# Patient Record
Sex: FEMALE | Race: WHITE | NOT HISPANIC OR LATINO
[De-identification: names, ages, dates, MRNs, and addresses within clinical notes are randomized per-mention and may not be internally consistent; named-entity substitution may affect disease eponyms.]

---

## 2017-05-26 ENCOUNTER — HEALTH MAINTENANCE LETTER (OUTPATIENT)
Age: 27
End: 2017-05-26

## 2017-06-14 ENCOUNTER — RECORDS - HEALTHEAST (OUTPATIENT)
Dept: ADMINISTRATIVE | Facility: OTHER | Age: 27
End: 2017-06-14

## 2018-02-23 ENCOUNTER — OFFICE VISIT - HEALTHEAST (OUTPATIENT)
Dept: FAMILY MEDICINE | Facility: CLINIC | Age: 28
End: 2018-02-23

## 2018-02-23 ENCOUNTER — COMMUNICATION - HEALTHEAST (OUTPATIENT)
Dept: TELEHEALTH | Facility: CLINIC | Age: 28
End: 2018-02-23

## 2018-02-23 DIAGNOSIS — Z01.818 ENCOUNTER FOR PREOPERATIVE EXAMINATION FOR GENERAL SURGICAL PROCEDURE: ICD-10-CM

## 2018-02-23 DIAGNOSIS — J32.9 CHRONIC SINUSITIS: ICD-10-CM

## 2018-02-23 DIAGNOSIS — J34.2 DEVIATED SEPTUM: ICD-10-CM

## 2018-02-23 LAB — HGB BLD-MCNC: 13.9 G/DL (ref 12–16)

## 2018-02-23 ASSESSMENT — MIFFLIN-ST. JEOR: SCORE: 1846.91

## 2018-05-25 ENCOUNTER — OFFICE VISIT - HEALTHEAST (OUTPATIENT)
Dept: FAMILY MEDICINE | Facility: CLINIC | Age: 28
End: 2018-05-25

## 2018-05-25 DIAGNOSIS — M79.7 FIBROMYALGIA: ICD-10-CM

## 2018-05-25 DIAGNOSIS — F41.1 GENERALIZED ANXIETY DISORDER: ICD-10-CM

## 2018-05-25 DIAGNOSIS — K21.9 ESOPHAGEAL REFLUX: ICD-10-CM

## 2018-05-25 DIAGNOSIS — J45.30 MILD PERSISTENT ASTHMA WITHOUT COMPLICATION: ICD-10-CM

## 2018-05-25 DIAGNOSIS — F32.A DEPRESSION: ICD-10-CM

## 2018-08-22 ENCOUNTER — AMBULATORY - HEALTHEAST (OUTPATIENT)
Dept: MULTI SPECIALTY CLINIC | Facility: CLINIC | Age: 28
End: 2018-08-22

## 2018-08-22 LAB
HPV_EXT - HISTORICAL: NORMAL
PAP SMEAR - HIM PATIENT REPORTED: NORMAL

## 2019-04-04 ENCOUNTER — OFFICE VISIT - HEALTHEAST (OUTPATIENT)
Dept: FAMILY MEDICINE | Facility: CLINIC | Age: 29
End: 2019-04-04

## 2019-04-04 DIAGNOSIS — J45.30 MILD PERSISTENT ASTHMA WITHOUT COMPLICATION: ICD-10-CM

## 2019-04-04 DIAGNOSIS — K21.9 ESOPHAGEAL REFLUX: ICD-10-CM

## 2019-04-04 DIAGNOSIS — M76.60 ACHILLES TENDON PAIN: ICD-10-CM

## 2019-04-04 DIAGNOSIS — Z01.818 ENCOUNTER FOR PREOPERATIVE EXAMINATION FOR GENERAL SURGICAL PROCEDURE: ICD-10-CM

## 2019-04-04 DIAGNOSIS — F32.A DEPRESSION: ICD-10-CM

## 2019-04-04 DIAGNOSIS — M79.7 FIBROMYALGIA: ICD-10-CM

## 2019-04-04 DIAGNOSIS — F41.1 GENERALIZED ANXIETY DISORDER: ICD-10-CM

## 2019-04-04 LAB
HCG UR QL: NEGATIVE
HGB BLD-MCNC: 13.6 G/DL (ref 12–16)

## 2019-05-20 ENCOUNTER — COMMUNICATION - HEALTHEAST (OUTPATIENT)
Dept: FAMILY MEDICINE | Facility: CLINIC | Age: 29
End: 2019-05-20

## 2019-05-20 DIAGNOSIS — F32.A DEPRESSION: ICD-10-CM

## 2019-10-18 ENCOUNTER — AMBULATORY - HEALTHEAST (OUTPATIENT)
Dept: LAB | Facility: CLINIC | Age: 29
End: 2019-10-18

## 2019-10-18 DIAGNOSIS — E66.01 MORBID OBESITY DUE TO EXCESS CALORIES (H): ICD-10-CM

## 2019-10-18 LAB
CORTIS SERPL-MCNC: 12.4 UG/DL
FASTING STATUS PATIENT QL REPORTED: YES
GLUCOSE BLD-MCNC: 79 MG/DL (ref 70–125)

## 2019-10-20 LAB — INSULIN SERPL-ACNC: 15.5 MU/L (ref 3–25)

## 2019-11-14 ENCOUNTER — OFFICE VISIT - HEALTHEAST (OUTPATIENT)
Dept: FAMILY MEDICINE | Facility: CLINIC | Age: 29
End: 2019-11-14

## 2019-11-14 DIAGNOSIS — M76.60 ACHILLES TENDON PAIN: ICD-10-CM

## 2019-11-14 DIAGNOSIS — Z01.818 ENCOUNTER FOR PREOPERATIVE EXAMINATION FOR GENERAL SURGICAL PROCEDURE: ICD-10-CM

## 2019-11-14 DIAGNOSIS — K58.0 IRRITABLE BOWEL SYNDROME WITH DIARRHEA: ICD-10-CM

## 2019-11-14 DIAGNOSIS — E66.01 MORBID OBESITY (H): ICD-10-CM

## 2019-11-14 LAB — HCG UR QL: NEGATIVE

## 2019-11-14 ASSESSMENT — ANXIETY QUESTIONNAIRES
IF YOU CHECKED OFF ANY PROBLEMS ON THIS QUESTIONNAIRE, HOW DIFFICULT HAVE THESE PROBLEMS MADE IT FOR YOU TO DO YOUR WORK, TAKE CARE OF THINGS AT HOME, OR GET ALONG WITH OTHER PEOPLE: SOMEWHAT DIFFICULT
3. WORRYING TOO MUCH ABOUT DIFFERENT THINGS: NEARLY EVERY DAY
6. BECOMING EASILY ANNOYED OR IRRITABLE: SEVERAL DAYS
4. TROUBLE RELAXING: NEARLY EVERY DAY
1. FEELING NERVOUS, ANXIOUS, OR ON EDGE: NEARLY EVERY DAY
2. NOT BEING ABLE TO STOP OR CONTROL WORRYING: MORE THAN HALF THE DAYS
5. BEING SO RESTLESS THAT IT IS HARD TO SIT STILL: MORE THAN HALF THE DAYS
7. FEELING AFRAID AS IF SOMETHING AWFUL MIGHT HAPPEN: NEARLY EVERY DAY
GAD7 TOTAL SCORE: 17

## 2019-11-14 ASSESSMENT — PATIENT HEALTH QUESTIONNAIRE - PHQ9: SUM OF ALL RESPONSES TO PHQ QUESTIONS 1-9: 12

## 2020-03-25 ENCOUNTER — COMMUNICATION - HEALTHEAST (OUTPATIENT)
Dept: FAMILY MEDICINE | Facility: CLINIC | Age: 30
End: 2020-03-25

## 2020-03-25 DIAGNOSIS — K21.9 ESOPHAGEAL REFLUX: ICD-10-CM

## 2020-05-11 ENCOUNTER — COMMUNICATION - HEALTHEAST (OUTPATIENT)
Dept: FAMILY MEDICINE | Facility: CLINIC | Age: 30
End: 2020-05-11

## 2020-05-11 DIAGNOSIS — F41.1 GENERALIZED ANXIETY DISORDER: ICD-10-CM

## 2020-05-11 DIAGNOSIS — M79.7 FIBROMYALGIA: ICD-10-CM

## 2020-05-11 DIAGNOSIS — F32.A DEPRESSION: ICD-10-CM

## 2020-05-16 ENCOUNTER — COMMUNICATION - HEALTHEAST (OUTPATIENT)
Dept: FAMILY MEDICINE | Facility: CLINIC | Age: 30
End: 2020-05-16

## 2020-05-16 DIAGNOSIS — J45.30 MILD PERSISTENT ASTHMA WITHOUT COMPLICATION: ICD-10-CM

## 2020-07-15 ENCOUNTER — RECORDS - HEALTHEAST (OUTPATIENT)
Dept: ADMINISTRATIVE | Facility: OTHER | Age: 30
End: 2020-07-15

## 2020-07-16 ENCOUNTER — RECORDS - HEALTHEAST (OUTPATIENT)
Dept: ADMINISTRATIVE | Facility: OTHER | Age: 30
End: 2020-07-16

## 2020-07-16 LAB
CREAT SERPL-MCNC: 0.82 MG/DL (ref 0.6–1.02)
GFR ESTIMATE EXT - HISTORICAL: >60 ML/MIN/1.73M2
GFR ESTIMATE, IF BLACK EXT - HISTORICAL: >60 ML/MIN/1.73M2

## 2020-07-17 ENCOUNTER — RECORDS - HEALTHEAST (OUTPATIENT)
Dept: ADMINISTRATIVE | Facility: OTHER | Age: 30
End: 2020-07-17

## 2020-07-21 ENCOUNTER — RECORDS - HEALTHEAST (OUTPATIENT)
Dept: ADMINISTRATIVE | Facility: OTHER | Age: 30
End: 2020-07-21

## 2020-07-23 ENCOUNTER — RECORDS - HEALTHEAST (OUTPATIENT)
Dept: HEALTH INFORMATION MANAGEMENT | Facility: CLINIC | Age: 30
End: 2020-07-23

## 2020-08-17 ENCOUNTER — COMMUNICATION - HEALTHEAST (OUTPATIENT)
Dept: FAMILY MEDICINE | Facility: CLINIC | Age: 30
End: 2020-08-17

## 2020-09-11 ENCOUNTER — RECORDS - HEALTHEAST (OUTPATIENT)
Dept: ADMINISTRATIVE | Facility: OTHER | Age: 30
End: 2020-09-11

## 2020-09-16 ENCOUNTER — COMMUNICATION - HEALTHEAST (OUTPATIENT)
Dept: SCHEDULING | Facility: CLINIC | Age: 30
End: 2020-09-16

## 2020-09-18 ENCOUNTER — COMMUNICATION - HEALTHEAST (OUTPATIENT)
Dept: FAMILY MEDICINE | Facility: CLINIC | Age: 30
End: 2020-09-18

## 2020-09-18 ENCOUNTER — OFFICE VISIT - HEALTHEAST (OUTPATIENT)
Dept: FAMILY MEDICINE | Facility: CLINIC | Age: 30
End: 2020-09-18

## 2020-09-18 DIAGNOSIS — Z71.89 COUNSELING AND COORDINATION OF CARE: ICD-10-CM

## 2020-09-21 ENCOUNTER — COMMUNICATION - HEALTHEAST (OUTPATIENT)
Dept: FAMILY MEDICINE | Facility: CLINIC | Age: 30
End: 2020-09-21

## 2020-09-22 ENCOUNTER — RECORDS - HEALTHEAST (OUTPATIENT)
Dept: ADMINISTRATIVE | Facility: OTHER | Age: 30
End: 2020-09-22

## 2020-10-08 ENCOUNTER — COMMUNICATION - HEALTHEAST (OUTPATIENT)
Dept: FAMILY MEDICINE | Facility: CLINIC | Age: 30
End: 2020-10-08

## 2020-10-22 ENCOUNTER — COMMUNICATION - HEALTHEAST (OUTPATIENT)
Dept: FAMILY MEDICINE | Facility: CLINIC | Age: 30
End: 2020-10-22

## 2020-11-17 ENCOUNTER — RECORDS - HEALTHEAST (OUTPATIENT)
Dept: ADMINISTRATIVE | Facility: OTHER | Age: 30
End: 2020-11-17

## 2021-01-08 ENCOUNTER — COMMUNICATION - HEALTHEAST (OUTPATIENT)
Dept: FAMILY MEDICINE | Facility: CLINIC | Age: 31
End: 2021-01-08

## 2021-01-08 DIAGNOSIS — F32.A DEPRESSION: ICD-10-CM

## 2021-02-14 ENCOUNTER — COMMUNICATION - HEALTHEAST (OUTPATIENT)
Dept: FAMILY MEDICINE | Facility: CLINIC | Age: 31
End: 2021-02-14

## 2021-02-14 DIAGNOSIS — J45.30 MILD PERSISTENT ASTHMA WITHOUT COMPLICATION: ICD-10-CM

## 2021-02-18 ENCOUNTER — RECORDS - HEALTHEAST (OUTPATIENT)
Dept: ADMINISTRATIVE | Facility: OTHER | Age: 31
End: 2021-02-18

## 2021-04-07 ENCOUNTER — COMMUNICATION - HEALTHEAST (OUTPATIENT)
Dept: FAMILY MEDICINE | Facility: CLINIC | Age: 31
End: 2021-04-07

## 2021-04-07 DIAGNOSIS — K21.9 ESOPHAGEAL REFLUX: ICD-10-CM

## 2021-04-15 ENCOUNTER — RECORDS - HEALTHEAST (OUTPATIENT)
Dept: ADMINISTRATIVE | Facility: OTHER | Age: 31
End: 2021-04-15

## 2021-04-26 ENCOUNTER — RECORDS - HEALTHEAST (OUTPATIENT)
Dept: ADMINISTRATIVE | Facility: OTHER | Age: 31
End: 2021-04-26

## 2021-05-10 ENCOUNTER — RECORDS - HEALTHEAST (OUTPATIENT)
Dept: ADMINISTRATIVE | Facility: OTHER | Age: 31
End: 2021-05-10

## 2021-05-25 ENCOUNTER — OFFICE VISIT - HEALTHEAST (OUTPATIENT)
Dept: FAMILY MEDICINE | Facility: CLINIC | Age: 31
End: 2021-05-25

## 2021-05-25 DIAGNOSIS — F33.0 MILD EPISODE OF RECURRENT MAJOR DEPRESSIVE DISORDER (H): ICD-10-CM

## 2021-05-25 DIAGNOSIS — F41.8 POSTPARTUM ANXIETY: ICD-10-CM

## 2021-05-25 ASSESSMENT — ANXIETY QUESTIONNAIRES
6. BECOMING EASILY ANNOYED OR IRRITABLE: SEVERAL DAYS
1. FEELING NERVOUS, ANXIOUS, OR ON EDGE: NEARLY EVERY DAY
2. NOT BEING ABLE TO STOP OR CONTROL WORRYING: NEARLY EVERY DAY
4. TROUBLE RELAXING: NEARLY EVERY DAY
7. FEELING AFRAID AS IF SOMETHING AWFUL MIGHT HAPPEN: NEARLY EVERY DAY
GAD7 TOTAL SCORE: 19
3. WORRYING TOO MUCH ABOUT DIFFERENT THINGS: NEARLY EVERY DAY
5. BEING SO RESTLESS THAT IT IS HARD TO SIT STILL: NEARLY EVERY DAY
IF YOU CHECKED OFF ANY PROBLEMS ON THIS QUESTIONNAIRE, HOW DIFFICULT HAVE THESE PROBLEMS MADE IT FOR YOU TO DO YOUR WORK, TAKE CARE OF THINGS AT HOME, OR GET ALONG WITH OTHER PEOPLE: SOMEWHAT DIFFICULT

## 2021-05-25 ASSESSMENT — PATIENT HEALTH QUESTIONNAIRE - PHQ9: SUM OF ALL RESPONSES TO PHQ QUESTIONS 1-9: 8

## 2021-05-26 ASSESSMENT — PATIENT HEALTH QUESTIONNAIRE - PHQ9: SUM OF ALL RESPONSES TO PHQ QUESTIONS 1-9: 12

## 2021-05-27 NOTE — PROGRESS NOTES
Preoperative Exam    Scheduled Procedure: Left achilles tendon repair  Surgery Date:  4/24/19  Surgery Location: [facility] FAX: [phone]  Surgeon:  Dr. Liao    Assessment/Plan:     1. Encounter for preoperative examination for general surgical procedure  Hemoglobin normal. Urine pregnancy negative.   - Pregnancy, Urine  - Hemoglobin    2. Achilles tendon pain    3. Mild persistent asthma without complication  ACT score of 24.  Well controlled.  Continue Singulair once daily and albuterol as needed.   - albuterol (PROAIR HFA;PROVENTIL HFA;VENTOLIN HFA) 90 mcg/actuation inhaler; Inhale 2-4 puffs every 6 (six) hours as needed for wheezing or shortness of breath.  Dispense: 1 each; Refill: 5  - montelukast (SINGULAIR) 10 mg tablet; Take 1 tablet (10 mg total) by mouth at bedtime.  Dispense: 90 tablet; Refill: 3    4. Generalized anxiety disorder  Well controlled.  - busPIRone (BUSPAR) 15 MG tablet; Take 1 tablet (15 mg total) by mouth 2 (two) times a day.  Dispense: 180 tablet; Refill: 3    5. Fibromyalgia  - nortriptyline (PAMELOR) 25 MG capsule; Take 1 capsule (25 mg total) by mouth at bedtime.  Dispense: 90 capsule; Refill: 3    6. Esophageal reflux  Well controlled on medication.   - pantoprazole (PROTONIX) 40 MG tablet; Take 1 tablet (40 mg total) by mouth daily.  Dispense: 90 tablet; Refill: 3    7. Depression  Well controlled on Zoloft.  She will continue seeing her therapist regularly.   - sertraline (ZOLOFT) 100 MG tablet; Take 1 tablet (100 mg total) by mouth daily.  Dispense: 90 tablet; Refill: 3     Surgical Procedure Risk: Low (reported cardiac risk generally < 1%)  Have you had prior anesthesia?: Yes  Have you or any family members had a previous anesthesia reaction:  No  Do you or any family members have a history of a clotting or bleeding disorder?: No  Cardiac Risk Assessment: no increased risk for major cardiac complications    Patient approved for surgery with general or local  anesthesia.    Please Note:  None    Functional Status: Independent  Patient plans to recover at home with family.     Subjective:      Paige Baumann is a 28 y.o. female who presents for a preoperative consultation.  She is scheduled for a left achilles tendon repair.  She has had persistent pain and swelling to this area for the past 4 years or so.  No history of rupture.  She has tried several conservative treatments including steroid injections, PT, immobilization, and a PRP injection.    Patient is also requesting medication refills.    Asthma: Currently on Singulair once daily and albuterol as needed.  Asthma is triggered by seasonal allergies and exercise.  Currently denies any cough, shortness of breath, wheezing, or chest pain.    History of anxiety and depression.  Currently on sertraline and BuSpar.  She has been struggling a little bit with anxiety, as her therapist has been on maternity leave.  She normally sees her 2 times a month.  Overall, patient reports her symptoms are well controlled, and the accommodation of medications is working well for her.  Denies any suicidal ideations.  She has recently started using some CBD oil, and this has been very beneficial for her anxiety.    Patient utilizes pantoprazole once daily for acid reflux.  This is working well for her.    On nortriptyline for fibromyalgia pain and headaches.  Patient did try discontinuing the medication on her own this past year, but did endorse increased body pain.  She is since back on the medication    All other systems reviewed and are negative, other than those listed in the HPI.    Pertinent History  Do you have difficulty breathing or chest pain after walking up a flight of stairs: No  History of obstructive sleep apnea: No  Steroid use in the last 6 months: No  Frequent Aspirin/NSAID use: No  Prior Blood Transfusion: No  Prior Blood Transfusion Reaction: N/A  If for some reason prior to, during or after the procedure, if it  is medically indicated, would you be willing to have a blood transfusion?:  There is no transfusion refusal.    Current Outpatient Medications   Medication Sig Dispense Refill     cetirizine (ZYRTEC) 10 MG tablet Take 10 mg by mouth.       clobetasol (TEMOVATE) 0.05 % external solution        ketoconazole (NIZORAL) 2 % shampoo WASH SCALP 2-3 TIMES PER WEEK. LET LATHER AND SOAK FOR A FEW MINUTES BEFORE RINSING  3     LOW-OGESTREL, 28, 0.3-30 mg-mcg per tablet Take 1 tablet by mouth daily.  3     naproxen (NAPROSYN) 500 MG tablet TAKE 1 TABLET BY MOUTH TWICE DAILY       valACYclovir (VALTREX) 1000 MG tablet TK 2 TS PO NOW AND REPEAT IN 12 H UTD       albuterol (PROAIR HFA;PROVENTIL HFA;VENTOLIN HFA) 90 mcg/actuation inhaler Inhale 2-4 puffs every 6 (six) hours as needed for wheezing or shortness of breath. 1 each 5     busPIRone (BUSPAR) 15 MG tablet Take 1 tablet (15 mg total) by mouth 2 (two) times a day. 180 tablet 3     montelukast (SINGULAIR) 10 mg tablet Take 1 tablet (10 mg total) by mouth at bedtime. 90 tablet 3     nortriptyline (PAMELOR) 25 MG capsule Take 1 capsule (25 mg total) by mouth at bedtime. 90 capsule 3     pantoprazole (PROTONIX) 40 MG tablet Take 1 tablet (40 mg total) by mouth daily. 90 tablet 3     sertraline (ZOLOFT) 100 MG tablet Take 1 tablet (100 mg total) by mouth daily. 90 tablet 3     No current facility-administered medications for this visit.         Allergies   Allergen Reactions     Cats Claw (Uncaria  [Cat's Claw (Uncaria Tomentosa)] Itching     Grass Itching     Levaquin [Levofloxacin]      Other Environmental Allergy Itching     Sulfa (Sulfonamide Antibiotics)      Quinolones Rash       Patient Active Problem List   Diagnosis     Vaginal septum     Single major depressive episode, in full remission (H)     Hidradenitis     Cervical high risk HPV (human papillomavirus) test positive     Anxiety state     Allergic rhinitis     Mild persistent asthma without complication      Esophageal reflux     Fibromyalgia     Pap smear abnormality of cervix with LGSIL       Past Medical History:   Diagnosis Date     Anemia      Anxiety      Asthma      Depression      Fibromyalgia        Past Surgical History:   Procedure Laterality Date     BUNIONECTOMY Left      SINUS SURGERY       TONSILLECTOMY       WISDOM TOOTH EXTRACTION         Social History     Socioeconomic History     Marital status: Single     Spouse name: Not on file     Number of children: Not on file     Years of education: Not on file     Highest education level: Not on file   Occupational History     Not on file   Social Needs     Financial resource strain: Not on file     Food insecurity:     Worry: Not on file     Inability: Not on file     Transportation needs:     Medical: Not on file     Non-medical: Not on file   Tobacco Use     Smoking status: Never Smoker     Smokeless tobacco: Never Used   Substance and Sexual Activity     Alcohol use: Yes     Alcohol/week: 3.0 oz     Types: 5 Shots of liquor per week     Drug use: No     Sexual activity: Yes     Partners: Male     Birth control/protection: OCP   Lifestyle     Physical activity:     Days per week: Not on file     Minutes per session: Not on file     Stress: Not on file   Relationships     Social connections:     Talks on phone: Not on file     Gets together: Not on file     Attends Jain service: Not on file     Active member of club or organization: Not on file     Attends meetings of clubs or organizations: Not on file     Relationship status: Not on file     Intimate partner violence:     Fear of current or ex partner: Not on file     Emotionally abused: Not on file     Physically abused: Not on file     Forced sexual activity: Not on file   Other Topics Concern     Not on file   Social History Narrative     Not on file       Patient Care Team:  Gisele Mandel NP as PCP - General (Family Medicine)          Objective:     Vitals:    04/04/19 1502   BP: 116/82    Pulse: 76   Weight: (!) 248 lb (112.5 kg)   LMP: 03/19/2019         Physical Exam:  General Appearance: Alert, cooperative, no distress, appears stated age  Eyes: PERRL, conjunctiva/corneas clear, EOM's intact  Ears: Normal TM's and external ear canals, both ears  Nose: Nares normal, septum midline,mucosa normal, no drainage  Throat: Lips, mucosa, and tongue normal; teeth and gums normal  Neck: Supple, symmetrical, trachea midline, no adenopathy;  thyroid: not enlarged, symmetric, no tenderness/mass/nodules; no carotid bruit or JVD  Back: no CVA tenderness  Lungs: Clear to auscultation bilaterally, respirations unlabored  Heart: Regular rate and rhythm, S1 and S2 normal, no murmur, rub, or gallop  Abdomen: Soft, non-tender, bowel sounds active all four quadrants,  no masses, no organomegaly  Extremities: Extremities normal, atraumatic, no cyanosis or edema  Skin: Skin color, texture, turgor normal, no rashes or lesions  Lymph nodes: Cervical, supraclavicular nodes normal  Neurologic: Normal   Psychologic: appropriate affective, answers all of my questions appropriately. No hallucinations, delusion, or suicidal ideations.      Patient Instructions     Hold all supplements, aspirin and NSAIDs for 7 days prior to surgery.  Follow your surgeon's direction on when to stop eating and drinking prior to surgery.  Your surgeon will be managing your pain after your surgery.    Remove all jewelry and metal piercings before your surgery.   Remove nail polish from fingers before surgery.      Labs:  Recent Results (from the past 24 hour(s))   Pregnancy, Urine    Collection Time: 04/04/19  3:37 PM   Result Value Ref Range    Pregnancy Test, Urine Negative Negative   Hemoglobin    Collection Time: 04/04/19  3:37 PM   Result Value Ref Range    Hemoglobin 13.6 12.0 - 16.0 g/dL       Immunization History   Administered Date(s) Administered     Dtap 1988, 02/22/1989, 04/21/1989, 05/17/1990, 03/31/1994     HPV Quadrivalent  03/08/2007, 05/08/2007     Hep A, Adult IM (19yr & older) 07/24/2007, 10/23/2012     Hep A, historic 10/23/2012     Hep B, Peds or Adolescent 06/15/1994, 08/28/1994, 01/28/1995     Hep B, historic 06/15/1994, 08/28/1994, 01/28/1995     Hepatitis A, Ped/Adol 2 Dose IM (18yr & under) 07/24/2007     HiB, historic,unspecified 05/17/1990     Hib (PRP-T) 05/17/1990     Influenza, Seasonal, Inj PF IIV3 10/30/2015     Influenza, inj, historic,unspecified 10/29/2002, 10/25/2004, 11/02/2005, 11/16/2006, 10/20/2008, 09/20/2012     Influenza,seasonal quad, PF, 36+MOS 12/03/2013     Influenza,seasonal quad, PF, 6-35MOS 12/03/2013     Influenza,seasonal, Inj IIV3 12/20/2000, 10/29/2002, 10/25/2004, 11/02/2005, 10/06/2010, 09/20/2012     MMR 01/12/1990, 03/05/2001     Meningococcal MCV4O 10/23/2012     Meningococcal MCV4P 07/19/2007     Meningococcal,Historic,Unknown serogroups 10/23/2012     Novel Influenza I3G3-64, Nasal 12/14/2009     OPV,Trivalent,Historic(9304-4715 only) 1988, 02/22/1989, 05/17/1990, 03/31/1994     POLIO, Unspecified 1988, 02/22/1989, 05/17/1990, 03/31/1994     PPD Test 07/24/2007     Td, Adult, Absorbed 09/25/2002     Tdap 10/23/2012     Typhoid, Inj, Inactive 10/23/2012     Typhoid, historic, Unspecified 10/23/2012     Yellow Fever 10/23/2012         Electronically signed by Gisele Mandel, VI 04/04/19 3:05 PM

## 2021-05-27 NOTE — PATIENT INSTRUCTIONS - HE
Hold all supplements, aspirin and NSAIDs for 7 days prior to surgery.  Follow your surgeon's direction on when to stop eating and drinking prior to surgery.  Your surgeon will be managing your pain after your surgery.    Remove all jewelry and metal piercings before your surgery.   Remove nail polish from fingers before surgery.

## 2021-05-28 ASSESSMENT — ASTHMA QUESTIONNAIRES: ACT_TOTALSCORE: 21

## 2021-05-28 ASSESSMENT — ANXIETY QUESTIONNAIRES: GAD7 TOTAL SCORE: 17

## 2021-06-01 VITALS — BODY MASS INDEX: 41.18 KG/M2 | WEIGHT: 251.3 LBS

## 2021-06-01 VITALS — BODY MASS INDEX: 39.97 KG/M2 | WEIGHT: 248.7 LBS | HEIGHT: 66 IN

## 2021-06-02 VITALS — WEIGHT: 248 LBS | BODY MASS INDEX: 41.27 KG/M2

## 2021-06-03 VITALS
TEMPERATURE: 98.6 F | DIASTOLIC BLOOD PRESSURE: 90 MMHG | BODY MASS INDEX: 43.93 KG/M2 | WEIGHT: 264 LBS | SYSTOLIC BLOOD PRESSURE: 137 MMHG | HEART RATE: 99 BPM

## 2021-06-03 NOTE — PATIENT INSTRUCTIONS - HE
Before Your Surgery       Call your surgeon if there is any change in your health. This includes signs of a cold or flu (such as a sore throat, runny nose, cough, rash or fever).       Do not smoke, drink alcohol or take over the counter medicine (unless your surgeon or primary care doctor tells you to) for the 24 hours before and after surgery.       If you take prescribed drugs: Follow your doctor s orders about which medicines to take and which to stop until after surgery.      Eating and drinking prior to surgery: follow the instructions from your surgeon.      Take a shower or bath the night before surgery. Use the soap your surgeon gave you to gently clean your skin. If you do not have soap from your surgeon, use your regular soap. Do not shave or scrub the surgery site. Wear clean pajamas and have clean sheets on your bed.             Hold all supplements, aspirin and NSAIDs for 7 days prior to surgery.    Follow your surgeon's direction on when to stop eating and drinking prior to surgery.    Your surgeon will be managing your pain after your surgery.      Remove all jewelry and metal piercings before your surgery.     Remove nail polish from fingers before surgery.

## 2021-06-03 NOTE — PROGRESS NOTES
Preoperative Exam    Scheduled Procedure: Left achilles repair  Surgery Date:  11/25/19   Surgery Location: [facility] FAX:[phone]    Surgeon:  Dr. Cornell    Assessment/Plan:     1. Encounter for preoperative examination for general surgical procedure  Urine pregnancy negative.  - Pregnancy, Urine    2. Achilles tendon pain    3. Irritable bowel syndrome with diarrhea  Lomotil as needed for diarrhea related to IBS.   - diphenoxylate-atropine (LOMOTIL) 2.5-0.025 mg per tablet; Take 1-2 tablets by mouth 4 (four) times a day as needed for diarrhea.  Dispense: 90 tablet; Refill: 0    4. Morbid obesity (H)  Weight loss has been difficult secondary to difficulty with exercise.  Encouraged good eating habits.  On Phentermine as prescribed by her gynecologist.      Surgical Procedure Risk: Low (reported cardiac risk generally < 1%)  Have you had prior anesthesia?: Yes  Have you or any family members had a previous anesthesia reaction:  No  Do you or any family members have a history of a clotting or bleeding disorder?: No  Cardiac Risk Assessment: no increased risk for major cardiac complications    APPROVAL GIVEN to proceed with proposed procedure, without further diagnostic evaluation    Please Note:  Patient has screws in her foot    Functional Status: Independent  Patient plans to recover at home with family.     Subjective:      Paige Baumann is a 29 y.o. female who presents for a preoperative consultation.  Patient is scheduled for a left Achilles tendon repair.  Patient previously had a surgery about 7 months ago.  Unfortunately, there was some issues with healing which caused problems with adequate rehabilitation.  She continues to have chronic daily pain to her left Achilles tendon.  Denies any pain at rest, but has pain with walking.  She has not been able to be very active secondary to her pain.  In turn, she has gained some weight.  Patient's gynecologist has started her on phentermine to help with  appetite suppression, and she has been able to lose a small amount of weight.    Patient is requesting a prescription for Lomotil.  She has a history of IBS, primarily with diarrhea.  She has tried Imodium, but this does not seem to work well for her.  She has been having more episodes of diarrhea.  Up to 1-2 episodes a day.  Denies any blood in her stools.  She is wondering if this is associated with her phentermine at all.    Reports her anxiety and depression have worsened with her inability to be active.  She realizes this is circumstantial, and does not think any changes in her medications need to be made.    All other systems reviewed and are negative, other than those listed in the HPI.    Pertinent History  Do you have difficulty breathing or chest pain after walking up a flight of stairs: No  History of obstructive sleep apnea: No  Steroid use in the last 6 months: No  Frequent Aspirin/NSAID use: No  Prior Blood Transfusion: No  Prior Blood Transfusion Reaction: No  If for some reason prior to, during or after the procedure, if it is medically indicated, would you be willing to have a blood transfusion?:  There is no transfusion refusal.    Current Outpatient Medications   Medication Sig Dispense Refill     albuterol (PROAIR HFA;PROVENTIL HFA;VENTOLIN HFA) 90 mcg/actuation inhaler Inhale 2-4 puffs every 6 (six) hours as needed for wheezing or shortness of breath. 1 each 5     busPIRone (BUSPAR) 15 MG tablet Take 1 tablet (15 mg total) by mouth 2 (two) times a day. 180 tablet 3     cetirizine (ZYRTEC) 10 MG tablet Take 10 mg by mouth.       clobetasol (TEMOVATE) 0.05 % external solution        ketoconazole (NIZORAL) 2 % shampoo WASH SCALP 2-3 TIMES PER WEEK. LET LATHER AND SOAK FOR A FEW MINUTES BEFORE RINSING  3     LOW-OGESTREL, 28, 0.3-30 mg-mcg per tablet Take 1 tablet by mouth daily.  3     montelukast (SINGULAIR) 10 mg tablet Take 1 tablet (10 mg total) by mouth at bedtime. 90 tablet 3     naproxen  (NAPROSYN) 500 MG tablet TAKE 1 TABLET BY MOUTH TWICE DAILY       nortriptyline (PAMELOR) 25 MG capsule Take 1 capsule (25 mg total) by mouth at bedtime. 90 capsule 3     pantoprazole (PROTONIX) 40 MG tablet Take 1 tablet (40 mg total) by mouth daily. 90 tablet 3     phentermine (ADIPEX-P) 37.5 mg tablet Take 37.5 mg by mouth.       sertraline (ZOLOFT) 100 MG tablet Take 1 tablet (100 mg total) by mouth daily. 90 tablet 3     valACYclovir (VALTREX) 1000 MG tablet TK 2 TS PO NOW AND REPEAT IN 12 H UTD       diphenoxylate-atropine (LOMOTIL) 2.5-0.025 mg per tablet Take 1-2 tablets by mouth 4 (four) times a day as needed for diarrhea. 90 tablet 0     No current facility-administered medications for this visit.         Allergies   Allergen Reactions     Cats Claw (Uncaria  [Cat's Claw (Uncaria Tomentosa)] Itching     Grass Itching     Levaquin [Levofloxacin]      Other Environmental Allergy Itching     Sulfa (Sulfonamide Antibiotics)      Quinolones Rash       Patient Active Problem List   Diagnosis     Vaginal septum     Single major depressive episode, in full remission (H)     Hidradenitis     Cervical high risk HPV (human papillomavirus) test positive     Anxiety state     Allergic rhinitis     Mild persistent asthma without complication     Esophageal reflux     Fibromyalgia     Pap smear abnormality of cervix with LGSIL     High risk human papilloma virus (HPV) infection of cervix     Morbid obesity (H)       Past Medical History:   Diagnosis Date     Anemia      Anxiety      Asthma      Depression      Fibromyalgia        Past Surgical History:   Procedure Laterality Date     BUNIONECTOMY Left      SINUS SURGERY       TONSILLECTOMY       WISDOM TOOTH EXTRACTION         Social History     Socioeconomic History     Marital status: Single     Spouse name: Not on file     Number of children: Not on file     Years of education: Not on file     Highest education level: Not on file   Occupational History     Not on file    Social Needs     Financial resource strain: Not on file     Food insecurity:     Worry: Not on file     Inability: Not on file     Transportation needs:     Medical: Not on file     Non-medical: Not on file   Tobacco Use     Smoking status: Never Smoker     Smokeless tobacco: Never Used   Substance and Sexual Activity     Alcohol use: Yes     Alcohol/week: 5.0 standard drinks     Types: 5 Shots of liquor per week     Drug use: No     Sexual activity: Yes     Partners: Male     Birth control/protection: OCP   Lifestyle     Physical activity:     Days per week: Not on file     Minutes per session: Not on file     Stress: Not on file   Relationships     Social connections:     Talks on phone: Not on file     Gets together: Not on file     Attends Sabianist service: Not on file     Active member of club or organization: Not on file     Attends meetings of clubs or organizations: Not on file     Relationship status: Not on file     Intimate partner violence:     Fear of current or ex partner: Not on file     Emotionally abused: Not on file     Physically abused: Not on file     Forced sexual activity: Not on file   Other Topics Concern     Not on file   Social History Narrative     Not on file       Patient Care Team:  Gisele Mandel NP as PCP - General (Family Medicine)  Gisele Mandel NP as Assigned PCP          Objective:     Vitals:    11/14/19 1331   BP: 137/90   Pulse: 99   Temp: 98.6  F (37  C)   TempSrc: Oral   Weight: (!) 264 lb (119.7 kg)   LMP: 10/24/2019       Physical Exam:  General Appearance: Alert, cooperative, no distress, appears stated age  Eyes: PERRL, conjunctiva/corneas clear, EOM's intact  Ears: Normal TM's and external ear canals, both ears  Nose: Nares normal, septum midline  Throat: Lips, mucosa, and tongue normal; teeth and gums normal  Neck: Supple, symmetrical, trachea midline, no adenopathy;  thyroid: not enlarged, symmetric, no tenderness/mass/nodules; no carotid bruit or JVD  Back:  no CVA tenderness  Lungs: Clear to auscultation bilaterally, respirations unlabored  Heart: Regular rate and rhythm, S1 and S2 normal, no murmur, rub, or gallop  Abdomen: Soft, non-tender, bowel sounds active all four quadrants, no masses, no organomegaly  Extremities: Extremities normal, atraumatic, no cyanosis or edema  Skin: Skin color, texture, turgor normal, no rashes  Lymph nodes: Cervical, supraclavicular nodes normal  Neurologic: Normal   Psychologic: appropriate affective, answers all of my questions appropriately. No hallucinations, delusion, or suicidal ideations.      Patient Instructions      Before Your Surgery       Call your surgeon if there is any change in your health. This includes signs of a cold or flu (such as a sore throat, runny nose, cough, rash or fever).       Do not smoke, drink alcohol or take over the counter medicine (unless your surgeon or primary care doctor tells you to) for the 24 hours before and after surgery.       If you take prescribed drugs: Follow your doctor s orders about which medicines to take and which to stop until after surgery.      Eating and drinking prior to surgery: follow the instructions from your surgeon.      Take a shower or bath the night before surgery. Use the soap your surgeon gave you to gently clean your skin. If you do not have soap from your surgeon, use your regular soap. Do not shave or scrub the surgery site. Wear clean pajamas and have clean sheets on your bed.             Hold all supplements, aspirin and NSAIDs for 7 days prior to surgery.    Follow your surgeon's direction on when to stop eating and drinking prior to surgery.    Your surgeon will be managing your pain after your surgery.      Remove all jewelry and metal piercings before your surgery.     Remove nail polish from fingers before surgery.      Labs:  Recent Results (from the past 24 hour(s))   Pregnancy, Urine    Collection Time: 11/14/19  1:59 PM   Result Value Ref Range     Pregnancy Test, Urine Negative Negative       Immunization History   Administered Date(s) Administered     Dtap 1988, 02/22/1989, 04/21/1989, 05/17/1990, 03/31/1994     HPV Quadrivalent 03/08/2007, 05/08/2007     Hep A, Adult IM (19yr & older) 07/24/2007, 10/23/2012     Hep A, historic 10/23/2012     Hep B, Peds or Adolescent 06/15/1994, 08/28/1994, 01/28/1995     Hep B, historic 06/15/1994, 08/28/1994, 01/28/1995     Hepatitis A, Ped/Adol 2 Dose IM (18yr & under) 07/24/2007     HiB, historic,unspecified 05/17/1990     Hib (PRP-T) 05/17/1990     Influenza, Seasonal, Inj PF IIV3 10/30/2015     Influenza, inj, historic,unspecified 10/29/2002, 10/25/2004, 11/02/2005, 11/16/2006, 10/20/2008, 09/20/2012     Influenza, seasonal,quad inj 6-35 mos 10/01/2018     Influenza,seasonal quad, PF, 6-35MOS 12/03/2013     Influenza,seasonal quad, PF, =/> 6months 12/03/2013, 10/01/2019     Influenza,seasonal, Inj IIV3 12/20/2000, 10/29/2002, 10/25/2004, 11/02/2005, 10/06/2010, 09/20/2012     MMR 01/12/1990, 03/05/2001     Meningococcal MCV4O 10/23/2012     Meningococcal MCV4P 07/19/2007     Meningococcal,Historic,Unknown serogroups 10/23/2012     Novel Influenza U6X1-15, Nasal 12/14/2009     OPV,Trivalent,Historic(3376-0689 only) 1988, 02/22/1989, 05/17/1990, 03/31/1994     POLIO, Unspecified 1988, 02/22/1989, 05/17/1990, 03/31/1994     PPD Test 07/24/2007     Td, Adult, Absorbed 09/25/2002     Tdap 10/23/2012     Typhoid, Inj, Inactive 10/23/2012     Typhoid, historic, Unspecified 10/23/2012     Yellow Fever 10/23/2012           Electronically signed by Gisele Mandel NP 11/14/19 1:22 PM

## 2021-06-07 NOTE — TELEPHONE ENCOUNTER
Refill Approved    Rx renewed per Medication Renewal Policy. Medication was last renewed on 4/4/19.    Radha Maldonado, [facility] Connection Triage/Med Refill 3/26/2020     Requested Prescriptions   Pending Prescriptions Disp Refills     pantoprazole (PROTONIX) 40 MG tablet [Pharmacy Med Name: PANTOPRAZOLE 40MG TABLETS] 90 tablet 3     Sig: TAKE 1 TABLET(40 MG) BY MOUTH DAILY       GI Medications Refill Protocol Passed - 3/25/2020 12:58 PM        Passed - PCP or prescribing provider visit in last 12 or next 3 months.     Last office visit with prescriber/PCP: 5/25/2018 Gisele Mandel NP OR same dept: Visit date not found OR same specialty: 5/25/2018 Gisele Mandel NP  Last physical: 11/14/2019 Last MTM visit: Visit date not found   Next visit within 3 mo: Visit date not found  Next physical within 3 mo: Visit date not found  Prescriber OR PCP: Gisele Mandel NP  Last diagnosis associated with med order: 1. Esophageal reflux  - pantoprazole (PROTONIX) 40 MG tablet [Pharmacy Med Name: PANTOPRAZOLE 40MG TABLETS]; TAKE 1 TABLET(40 MG) BY MOUTH DAILY  Dispense: 90 tablet; Refill: 3    If protocol passes may refill for 12 months if within 3 months of last provider visit (or a total of 15 months).

## 2021-06-08 NOTE — TELEPHONE ENCOUNTER
Refill Approved    Rx renewed per Medication Renewal Policy. Medication was last renewed on 4/4/19.    Radha Maldonado, [facility] Connection Triage/Med Refill 5/13/2020     Requested Prescriptions   Pending Prescriptions Disp Refills     sertraline (ZOLOFT) 100 MG tablet [Pharmacy Med Name: SERTRALINE 100MG TABLETS] 90 tablet 3     Sig: TAKE 1 TABLET(100 MG) BY MOUTH DAILY       SSRI Refill Protocol  Passed - 5/11/2020  1:11 PM        Passed - PCP or prescribing provider visit in last year     Last office visit with prescriber/PCP: 5/25/2018 Gisele Mandel NP OR same dept: Visit date not found OR same specialty: 5/25/2018 Gisele Mandel NP  Last physical: 11/14/2019 Last MTM visit: Visit date not found   Next visit within 3 mo: Visit date not found  Next physical within 3 mo: Visit date not found  Prescriber OR PCP: Gisele Mandel NP  Last diagnosis associated with med order: 1. Depression  - sertraline (ZOLOFT) 100 MG tablet [Pharmacy Med Name: SERTRALINE 100MG TABLETS]; TAKE 1 TABLET(100 MG) BY MOUTH DAILY  Dispense: 90 tablet; Refill: 3    2. Fibromyalgia  - nortriptyline (PAMELOR) 25 MG capsule [Pharmacy Med Name: NORTRIPTYLINE 25MG CAPSULES]; TAKE 1 CAPSULE(25 MG) BY MOUTH AT BEDTIME  Dispense: 90 capsule; Refill: 3    3. Generalized anxiety disorder  - busPIRone (BUSPAR) 15 MG tablet [Pharmacy Med Name: BUSPIRONE 15MG TABLETS]; TAKE 1 TABLET(15 MG) BY MOUTH TWICE DAILY  Dispense: 180 tablet; Refill: 3    If protocol passes may refill for 12 months if within 3 months of last provider visit (or a total of 15 months).                nortriptyline (PAMELOR) 25 MG capsule [Pharmacy Med Name: NORTRIPTYLINE 25MG CAPSULES] 90 capsule 3     Sig: TAKE 1 CAPSULE(25 MG) BY MOUTH AT BEDTIME       Tricyclics/Misc Antidepressant/Antianxiety Meds Refill Protocol Passed - 5/11/2020  1:11 PM        Passed - PCP or prescribing provider visit in last year     Last office visit with prescriber/PCP: 5/25/2018 Tequila  Gisele E, NP OR same dept: Visit date not found OR same specialty: 5/25/2018 Gisele Mandel NP  Last physical: 11/14/2019 Last MTM visit: Visit date not found   Next visit within 3 mo: Visit date not found  Next physical within 3 mo: Visit date not found  Prescriber OR PCP: Gisele Mandel NP  Last diagnosis associated with med order: 1. Depression  - sertraline (ZOLOFT) 100 MG tablet [Pharmacy Med Name: SERTRALINE 100MG TABLETS]; TAKE 1 TABLET(100 MG) BY MOUTH DAILY  Dispense: 90 tablet; Refill: 3    2. Fibromyalgia  - nortriptyline (PAMELOR) 25 MG capsule [Pharmacy Med Name: NORTRIPTYLINE 25MG CAPSULES]; TAKE 1 CAPSULE(25 MG) BY MOUTH AT BEDTIME  Dispense: 90 capsule; Refill: 3    3. Generalized anxiety disorder  - busPIRone (BUSPAR) 15 MG tablet [Pharmacy Med Name: BUSPIRONE 15MG TABLETS]; TAKE 1 TABLET(15 MG) BY MOUTH TWICE DAILY  Dispense: 180 tablet; Refill: 3    If protocol passes may refill for 12 months if within 3 months of last provider visit (or a total of 15 months).                busPIRone (BUSPAR) 15 MG tablet [Pharmacy Med Name: BUSPIRONE 15MG TABLETS] 180 tablet 3     Sig: TAKE 1 TABLET(15 MG) BY MOUTH TWICE DAILY       Tricyclics/Misc Antidepressant/Antianxiety Meds Refill Protocol Passed - 5/11/2020  1:11 PM        Passed - PCP or prescribing provider visit in last year     Last office visit with prescriber/PCP: 5/25/2018 Gisele Mandel NP OR same dept: Visit date not found OR same specialty: 5/25/2018 Gisele Mandel NP  Last physical: 11/14/2019 Last MTM visit: Visit date not found   Next visit within 3 mo: Visit date not found  Next physical within 3 mo: Visit date not found  Prescriber OR PCP: Gisele Mandel NP  Last diagnosis associated with med order: 1. Depression  - sertraline (ZOLOFT) 100 MG tablet [Pharmacy Med Name: SERTRALINE 100MG TABLETS]; TAKE 1 TABLET(100 MG) BY MOUTH DAILY  Dispense: 90 tablet; Refill: 3    2. Fibromyalgia  - nortriptyline (PAMELOR) 25 MG  capsule [Pharmacy Med Name: NORTRIPTYLINE 25MG CAPSULES]; TAKE 1 CAPSULE(25 MG) BY MOUTH AT BEDTIME  Dispense: 90 capsule; Refill: 3    3. Generalized anxiety disorder  - busPIRone (BUSPAR) 15 MG tablet [Pharmacy Med Name: BUSPIRONE 15MG TABLETS]; TAKE 1 TABLET(15 MG) BY MOUTH TWICE DAILY  Dispense: 180 tablet; Refill: 3    If protocol passes may refill for 12 months if within 3 months of last provider visit (or a total of 15 months).

## 2021-06-08 NOTE — TELEPHONE ENCOUNTER
Refill Approved    Rx renewed per Medication Renewal Policy. Medication was last renewed on 4/4/19.    Radha Maldonado, [facility] Triage/Med Refill 5/19/2020     Requested Prescriptions   Pending Prescriptions Disp Refills     VENTOLIN HFA 90 mcg/actuation inhaler [Pharmacy Med Name: VENTOLIN HFA INH W/DOS CTR 200PUFFS] 18 g 0     Sig: INHALE 2 TO 4 PUFFS BY MOUTH EVERY 6 HOURS AS NEEDED FOR WHEEZING OR SHORTNESS OF BREATH       Albuterol/Levalbuterol Refill Protocol Passed - 5/16/2020  4:50 PM        Passed - PCP or prescribing provider visit in last year     Last office visit with prescriber/PCP: 5/25/2018 Gisele Mandel NP OR same dept: Visit date not found OR same specialty: 5/25/2018 Gisele Mandel NP Last physical: 11/14/2019       Next appt within 3 mo: Visit date not found  Next physical within 3 mo: Visit date not found  Prescriber OR PCP: Gisele Mandel NP  Last diagnosis associated with med order: 1. Mild persistent asthma without complication  - VENTOLIN HFA 90 mcg/actuation inhaler [Pharmacy Med Name: VENTOLIN HFA INH W/DOS CTR 200PUFFS]; INHALE 2 TO 4 PUFFS BY MOUTH EVERY 6 HOURS AS NEEDED FOR WHEEZING OR SHORTNESS OF BREATH  Dispense: 18 g; Refill: 0    If protocol passes may refill for 6 months if within 3 months of last provider visit (or a total of 9 months). If patient requesting >1 inhaler per month refill x 6 months and have patient make appointment with provider.

## 2021-06-10 NOTE — TELEPHONE ENCOUNTER
Medication Question or Clarification  Who is calling: The patient   What medication are you calling about (include dose and sig)?: The patient is on several medications.   Who prescribed the medication?: Gisele Mandel NP   What is your question/concern?: The patient states she had a home positive pregnancy test and would like to know which medications she needs to discontinue.   Requested Pharmacy: NA  Okay to leave a detailed message?: Yes

## 2021-06-10 NOTE — TELEPHONE ENCOUNTER
I am advising in Gisele's absence, I do not want the patient to abruptly discontinue any medications however she needs either a prenatal visit or an office visit in the very near future to discuss which of these medications should be continued or modified.

## 2021-06-10 NOTE — TELEPHONE ENCOUNTER
Who is calling:  Patient  Reason for Call:    Patient states she will call her OB Doctor to discuss medications.  Thank you  Date of last appointment with primary care: 11/14/2019  Okay to leave a detailed message: No return call needed.

## 2021-06-11 NOTE — TELEPHONE ENCOUNTER
"Who is requesting the letter?  Patient  Why is the letter needed? Pt has yet to receive \"email\". Please send via [employer] as this is HIPAA safe. Please advise  How would you like this letter returned? Karelyhart  Okay to leave a detailed message? No  "

## 2021-06-11 NOTE — PROGRESS NOTES
"Paige Baumann is a 29 y.o. female who is being evaluated via a billable video visit.      The patient has been notified of following:     \"This video visit will be conducted via a call between you and your physician/provider. We have found that certain health care needs can be provided without the need for an in-person physical exam.  This service lets us provide the care you need with a video conversation.  If a prescription is necessary we can send it directly to your pharmacy.  If lab work is needed we can place an order for that and you can then stop by our lab to have the test done at a later time.    Video visits are billed at different rates depending on your insurance coverage. Please reach out to your insurance provider with any questions.    If during the course of the call the physician/provider feels a video visit is not appropriate, you will not be charged for this service.\"    Patient has given verbal consent to a Video visit? Yes  How would you like to obtain your AVS? AVS Preference: [place].  If dropped by the video visit, the video invitation should be sent to: Text to cell phone: cell  Will anyone else be joining your video visit? No        Video Start Time: 1600    Patient seeks video visit consultation today with regard to some questions she has regarding the COVID-19 pandemic and her current pregnancy.    She is about 8 weeks pregnant and is doing well.  She recently had her first ultrasound; things seem to be going as planned with no complications.    She does have a few chronic health conditions that put her at increased risk for COVID-19 related complications.  She has asthma.  She is overweight.    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  Under normal situations, she is usually in contact with many adults and high school-aged children throughout the day.  She wonders if she should be working remotely.  If possible, she would like some medical documentation so that she can " perform her job from home.    She otherwise feeling well and has no acute complaints today.    1. Counseling and coordination of care      I see no reason why she should not be able to work remotely, given her pregnancy and underlying health conditions within the context of this COVID-19 pandemic.  I drafted a letter and sent it to her via [employer].  If she has any other needs at any point, she is free to reach out to me with any issues.         Video-Visit Details    Type of service:  Video Visit    Video End Time (time video stopped): 4:18 PM  Originating Location (pt. Location): Home    Distant Location (provider location):  [facility] FAMILY MED[facility]/OB     Platform used for Video Visit: Elisabeth Real CNP

## 2021-06-11 NOTE — TELEPHONE ENCOUNTER
Who is calling:  Patient  Reason for Call:    Patient is requesting the Letter of 9/18/2020 be uploaded to [employer] and a copy of letter be mailed to patient's address on file.  Thank you.  Date of last appointment with primary care: 9/18/2020  Okay to leave a detailed message: Yes

## 2021-06-11 NOTE — TELEPHONE ENCOUNTER
Who is requesting the letter?  Patient  Why is the letter needed? 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   As the patient is 8 weeks pregnant, gives a history of asthma, fibromyalgia and being over weight the patient is asking if the provider would be in agreement that she is best to work from home, will need letter stating this for her employer.Please advise.How would you like this letter returned? Patient would   Okay to leave a detailed message? Yes

## 2021-06-12 NOTE — TELEPHONE ENCOUNTER
Who is requesting the letter?  Work excuse   Why is the letter needed? Need more detail as for why patient needs to work remotely.   How would you like this letter returned? My chart or mail and copy   Okay to leave a detailed message? Yes

## 2021-06-12 NOTE — TELEPHONE ENCOUNTER
I created a letter for her.  I printed it out; she can come and pick that up.    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

## 2021-06-12 NOTE — TELEPHONE ENCOUNTER
Per patient she believes employer needs medical documentation that would category patient as a high risk or increased risk.

## 2021-06-14 NOTE — TELEPHONE ENCOUNTER
Refill Approved    Rx renewed per Medication Renewal Policy. Medication was last renewed on 5/13/20.    Radha Maldonado, [facility] Triage/Med Refill 1/8/2021     Requested Prescriptions   Pending Prescriptions Disp Refills     sertraline (ZOLOFT) 100 MG tablet [Pharmacy Med Name: SERTRALINE 100MG TABLETS] 90 tablet 1     Sig: TAKE 1 TABLET(100 MG) BY MOUTH DAILY       SSRI Refill Protocol  Passed - 1/8/2021  7:35 AM        Passed - PCP or prescribing provider visit in last year     Last office visit with prescriber/PCP: 5/25/2018 Gisele Mandel NP OR same dept: Visit date not found OR same specialty: 5/25/2018 Gisele Madnel NP  Last physical: 11/14/2019 Last MTM visit: Visit date not found   Next visit within 3 mo: Visit date not found  Next physical within 3 mo: Visit date not found  Prescriber OR PCP: Gisele Mandel NP  Last diagnosis associated with med order: 1. Depression  - sertraline (ZOLOFT) 100 MG tablet [Pharmacy Med Name: SERTRALINE 100MG TABLETS]; TAKE 1 TABLET(100 MG) BY MOUTH DAILY  Dispense: 90 tablet; Refill: 1    If protocol passes may refill for 12 months if within 3 months of last provider visit (or a total of 15 months).

## 2021-06-15 ENCOUNTER — OFFICE VISIT - HEALTHEAST (OUTPATIENT)
Dept: FAMILY MEDICINE | Facility: CLINIC | Age: 31
End: 2021-06-15

## 2021-06-15 DIAGNOSIS — F41.8 POSTPARTUM ANXIETY: ICD-10-CM

## 2021-06-15 ASSESSMENT — ANXIETY QUESTIONNAIRES
IF YOU CHECKED OFF ANY PROBLEMS ON THIS QUESTIONNAIRE, HOW DIFFICULT HAVE THESE PROBLEMS MADE IT FOR YOU TO DO YOUR WORK, TAKE CARE OF THINGS AT HOME, OR GET ALONG WITH OTHER PEOPLE: SOMEWHAT DIFFICULT
6. BECOMING EASILY ANNOYED OR IRRITABLE: SEVERAL DAYS
4. TROUBLE RELAXING: NEARLY EVERY DAY
2. NOT BEING ABLE TO STOP OR CONTROL WORRYING: NEARLY EVERY DAY
5. BEING SO RESTLESS THAT IT IS HARD TO SIT STILL: NEARLY EVERY DAY
GAD7 TOTAL SCORE: 19
3. WORRYING TOO MUCH ABOUT DIFFERENT THINGS: NEARLY EVERY DAY
7. FEELING AFRAID AS IF SOMETHING AWFUL MIGHT HAPPEN: NEARLY EVERY DAY
1. FEELING NERVOUS, ANXIOUS, OR ON EDGE: NEARLY EVERY DAY

## 2021-06-15 ASSESSMENT — PATIENT HEALTH QUESTIONNAIRE - PHQ9: SUM OF ALL RESPONSES TO PHQ QUESTIONS 1-9: 8

## 2021-06-15 NOTE — TELEPHONE ENCOUNTER
Refill Approved    Rx renewed per Medication Renewal Policy. Medication was last renewed on 5/19/20, last OV 9/18/20.    Gladys Hayes, [facility] Triage/Med Refill 2/14/2021     Requested Prescriptions   Pending Prescriptions Disp Refills     VENTOLIN HFA 90 mcg/actuation inhaler [Pharmacy Med Name: VENTOLIN HFA INH W/DOS CTR 200PUFFS] 18 g 2     Sig: INHALE 2 TO 4 PUFFS BY MOUTH EVERY 6 HOURS AS NEEDED FOR WHEEZING OR SHORTNESS OF BREATH       Albuterol/Levalbuterol Refill Protocol Passed - 2/14/2021  2:15 PM        Passed - PCP or prescribing provider visit in last year     Last office visit with prescriber/PCP: 5/25/2018 Gisele Mandel NP OR same dept: Visit date not found OR same specialty: 5/25/2018 Gisele Mandel NP Last physical: 11/14/2019       Next appt within 3 mo: Visit date not found  Next physical within 3 mo: Visit date not found  Prescriber OR PCP: Gisele Mandel NP  Last diagnosis associated with med order: 1. Mild persistent asthma without complication  - VENTOLIN HFA 90 mcg/actuation inhaler [Pharmacy Med Name: VENTOLIN HFA INH W/DOS CTR 200PUFFS]; INHALE 2 TO 4 PUFFS BY MOUTH EVERY 6 HOURS AS NEEDED FOR WHEEZING OR SHORTNESS OF BREATH  Dispense: 18 g; Refill: 2    If protocol passes may refill for 6 months if within 3 months of last provider visit (or a total of 9 months). If patient requesting >1 inhaler per month refill x 6 months and have patient make appointment with provider.

## 2021-06-16 NOTE — PROGRESS NOTES
Preoperative Exam    Scheduled Procedure: Sinus Surgery  Surgery Date:  03/09/2018  Surgery Location: [facility], [place], fax [phone]    Surgeon:  Dr. Mace    Assessment/Plan:     1. Encounter for preoperative examination for general surgical procedure  - Hemoglobin    2. Chronic sinusitis    3. Deviated septum      Surgical Procedure Risk: Low (reported cardiac risk generally < 1%)  Have you had prior anesthesia?: Yes  Have you or any family members had a previous anesthesia reaction:  No  Do you or any family members have a history of a clotting or bleeding disorder?: No  Cardiac Risk Assessment: no increased risk for major cardiac complications    Patient approved for surgery with general or local anesthesia.    Please Note:  none    Functional Status: Independent  Patient plans to recover at home with family.     Subjective:      Paige Baumann is a 27 y.o. female who presents for a preoperative consultation.  Patient will be undergoing sinus surgery and repair of a deviated septum on 3/9/18.  History of chronic sinus infections and left deviated septum.  Patient has had a lingering sinus infection since November.  She has been treated with 2 courses of antibiotics and prednisone, with no improvement of symptoms.  Patient has a dry cough and voice hoarseness secondary to sinus drainage.    Pertinent past history of anxiety, depression, asthma, GERD, cervical HPV, and fibromyalgia.      Patient offers no complaints or concerns today.      All other systems reviewed and are negative, other than those listed in the HPI.    Pertinent History  Do you have difficulty breathing or chest pain after walking up a flight of stairs: No  History of obstructive sleep apnea: No  Steroid use in the last 6 months: Yes: prednisone, 2 weeks ago. Patient is finished  Frequent Aspirin/NSAID use: No  Prior Blood Transfusion: No  Prior Blood Transfusion Reaction: No  If for some reason prior to, during or after  the procedure, if it is medically indicated, would you be willing to have a blood transfusion?:  There is no transfusion refusal.    Current Outpatient Prescriptions   Medication Sig Dispense Refill     albuterol (PROAIR HFA;PROVENTIL HFA;VENTOLIN HFA) 90 mcg/actuation inhaler Inhale 2-4 puffs.       busPIRone (BUSPAR) 5 MG tablet 2 (two) times a day.        cetirizine (ZYRTEC) 10 MG tablet Take 10 mg by mouth.       desogestrel-ethinyl estradiol (APRI) 0.15-0.03 mg per tablet TAKE 1 TABLET BY MOUTH EVERY DAY       naproxen (NAPROSYN) 500 MG tablet TAKE 1 TABLET BY MOUTH TWICE DAILY       nortriptyline (PAMELOR) 25 MG capsule TK 1 C PO TID  1     pantoprazole (PROTONIX) 40 MG tablet TK 1 T PO QD       sertraline (ZOLOFT) 100 MG tablet        valACYclovir (VALTREX) 1000 MG tablet TK 2 TS PO NOW AND REPEAT IN 12 H UTD       No current facility-administered medications for this visit.         Allergies   Allergen Reactions     Cats Claw (Uncaria  [Cat's Claw (Uncaria Tomentosa)] Itching     Grass Itching     Levaquin [Levofloxacin]      Other Environmental Allergy Itching     Sulfa (Sulfonamide Antibiotics)      Quinolones Rash       Patient Active Problem List   Diagnosis     Vaginal septum     Single major depressive episode, in full remission     Hidradenitis     Cervical high risk HPV (human papillomavirus) test positive     Anxiety state     Allergic rhinitis     Mild persistent asthma without complication     Esophageal reflux       Past Medical History:   Diagnosis Date     Anemia      Anxiety      Asthma      Depression      Fibromyalgia        Past Surgical History:   Procedure Laterality Date     BUNIONECTOMY Left      TONSILLECTOMY       WISDOM TOOTH EXTRACTION         Social History     Social History     Marital status: Single     Spouse name: N/A     Number of children: N/A     Years of education: N/A     Occupational History     Not on file.     Social History Main Topics     Smoking status: Never Smoker  "    Smokeless tobacco: Never Used     Alcohol use 3.0 oz/week     5 Shots of liquor per week     Drug use: No     Sexual activity: Yes     Partners: Male     Birth control/ protection: OCP     Other Topics Concern     Not on file     Social History Narrative         Objective:     Vitals:    02/23/18 1514   BP: 120/76   Pulse: 60   Temp: 98.9  F (37.2  C)   TempSrc: Oral   Weight: (!) 248 lb 11.2 oz (112.8 kg)   Height: 5' 5.5\" (1.664 m)   LMP: 02/20/2018         Physical Exam:  General Appearance: Alert, cooperative, no distress, appears stated age  Head: Normocephalic, without obvious abnormality, atraumatic  Eyes: PERRL, conjunctiva/corneas clear, EOM's intact  Ears: Normal TM's and external ear canals, both ears  Nose: Nares normal, septum midline,mucosa normal, no drainage  Throat: Lips, mucosa, and tongue normal; teeth and gums normal  Neck: Supple, symmetrical, trachea midline, no adenopathy;  thyroid: not enlarged, symmetric, no tenderness/mass/nodules; no carotid bruit or JVD  Lungs: Clear to auscultation bilaterally, respirations unlabored  Heart: Regular rate and rhythm, S1 and S2 normal, no murmur, rub, or gallop   Abdomen: Soft, non-tender, bowel sounds active all four quadrants,  no masses, no organomegaly  Extremities: Extremities normal, atraumatic, no cyanosis or edema  Skin: Skin color, texture, turgor normal, no rashes or lesions  Lymph nodes: Cervical, supraclavicular, and axillary nodes normal  Neurologic: Normal  Psychologic: appropriate affective, answers all of my questions appropriately. No hallucinations, delusion, or suicidal ideations.    Patient Instructions     Hold all supplements, aspirin and NSAIDs for 7 days prior to surgery.  Follow your surgeon's direction on when to stop eating and drinking prior to surgery.  Your surgeon will be managing your pain after your surgery.    Remove all jewelry and metal piercings before your surgery.   Remove nail polish from fingers before " surgery.      Labs:  Recent Results (from the past 24 hour(s))   Hemoglobin    Collection Time: 02/23/18  3:53 PM   Result Value Ref Range    Hemoglobin 13.9 12.0 - 16.0 g/dL       Immunization History   Administered Date(s) Administered     Dtap 1988, 02/22/1989, 04/21/1989, 05/17/1990, 03/31/1994     HPV Quadrivalent 03/08/2007, 05/08/2007     Hep A, Adult IM (19yr & older) 07/24/2007, 10/23/2012     Hep A, historic 10/23/2012     Hep B, Peds or Adolescent 06/15/1994, 08/28/1994, 01/28/1995     Hep B, historic 06/15/1994, 08/28/1994, 01/28/1995     Hepatitis A, Ped/Adol 2 Dose IM (18yr & under) 07/24/2007     HiB, historic,unspecified 05/17/1990     Hib (PRP-T) 05/17/1990     Influenza, Seasonal, Inj PF IIV3 10/30/2015     Influenza, inj, historic,unspecified 10/29/2002, 10/25/2004, 11/02/2005, 11/16/2006, 10/20/2008, 09/20/2012     Influenza,seasonal quad, PF, 36+MOS 12/03/2013     Influenza,seasonal quad, PF, 6-35MOS 12/03/2013     Influenza,seasonal, Inj IIV3 12/20/2000, 10/29/2002, 10/25/2004, 11/02/2005, 10/06/2010, 09/20/2012     MMR 01/12/1990, 03/05/2001     Meningococcal MCV4O 10/23/2012     Meningococcal MCV4P 07/19/2007     Meningococcal,Historic,Unknown serogroups 10/23/2012     Novel Influenza O1H2-04, Nasal 12/14/2009     OPV,Trivalent,Historic(7295-9920 only) 1988, 02/22/1989, 05/17/1990, 03/31/1994     POLIO, Unspecified 1988, 02/22/1989, 05/17/1990, 03/31/1994     PPD Test 07/24/2007     Td, Adult, Absorbed 09/25/2002     Tdap 10/23/2012     Typhoid, Inj, Inactive 10/23/2012     Typhoid, historic, Unspecified 10/23/2012     Yellow Fever 10/23/2012           Electronically signed by Gisele Mandel NP 02/23/18 3:00 PM

## 2021-06-16 NOTE — TELEPHONE ENCOUNTER
RN cannot approve Refill Request    RN can NOT refill this medication PCP messaged that patient is overdue for Office Visit and Protocol failed and NO refill given. Last office visit: 5/25/2018 Gisele Mandel NP Last Physical: 11/14/2019 Last MTM visit: Visit date not found Last visit same specialty: 5/25/2018 Gisele Mandel NP.  Next visit within 3 mo: Visit date not found  Next physical within 3 mo: Visit date not found      Zahra Braden, [facility] Triage/Med Refill 4/8/2021    Requested Prescriptions   Pending Prescriptions Disp Refills     pantoprazole (PROTONIX) 40 MG tablet [Pharmacy Med Name: PANTOPRAZOLE 40MG TABLETS] 90 tablet 2     Sig: TAKE 1 TABLET(40 MG) BY MOUTH DAILY       GI Medications Refill Protocol Passed - 4/7/2021  1:45 PM        Passed - PCP or prescribing provider visit in last 12 or next 3 months.     Last office visit with prescriber/PCP: 5/25/2018 Gisele Mandel NP OR same dept: Visit date not found OR same specialty: 5/25/2018 Gisele Mandel NP  Last physical: 11/14/2019 Last MTM visit: Visit date not found   Next visit within 3 mo: Visit date not found  Next physical within 3 mo: Visit date not found  Prescriber OR PCP: Gisele Mandel NP  Last diagnosis associated with med order: 1. Esophageal reflux  - pantoprazole (PROTONIX) 40 MG tablet [Pharmacy Med Name: PANTOPRAZOLE 40MG TABLETS]; TAKE 1 TABLET(40 MG) BY MOUTH DAILY  Dispense: 90 tablet; Refill: 2    If protocol passes may refill for 12 months if within 3 months of last provider visit (or a total of 15 months).

## 2021-06-16 NOTE — TELEPHONE ENCOUNTER
Please call patient.    She is overdue for a physical and med check.  Please help her schedule so we can continue her medications.    Gisele Mandel NP

## 2021-06-18 NOTE — PROGRESS NOTES
"Assessment/Plan:     1. Mild persistent asthma without complication  Asthma well controlled.  Continue albuterol as needed in conjunction with Singulair once daily.  - albuterol (PROAIR HFA;PROVENTIL HFA;VENTOLIN HFA) 90 mcg/actuation inhaler; Inhale 2-4 puffs every 6 (six) hours as needed for wheezing.  Dispense: 1 each; Refill: 5    2. Generalized anxiety disorder  Anxiety stable on current dose of buspirone.  Continue as prescribed.  - busPIRone (BUSPAR) 15 MG tablet; Take 1 tablet (15 mg total) by mouth 2 (two) times a day.  Dispense: 180 tablet; Refill: 3    3. Depression  Depression stable on current dose of Zoloft.  Continue as prescribed.  - sertraline (ZOLOFT) 100 MG tablet; Take 1 tablet (100 mg total) by mouth daily.  Dispense: 90 tablet; Refill: 3    4. Fibromyalgia  Consider referral to rheumatology if patient is having more pain.  We will continue the nortriptyline at bedtime for headaches.  - nortriptyline (PAMELOR) 25 MG capsule; Take 1 capsule (25 mg total) by mouth at bedtime.  Dispense: 90 capsule; Refill: 3    5. Esophageal reflux  - pantoprazole (PROTONIX) 40 MG tablet; Take 1 tablet (40 mg total) by mouth daily.  Dispense: 90 tablet; Refill: 3        Subjective:     Paige Baumann is a 27 y.o. female who presents for medication refills.    Depression and anxiety: Patient diagnosed with this in her teens.  She has been on a variety of different medications.  Most recently on Zoloft, 100 mg once daily and buspirone, 15 mg twice daily.  Patient has been on this regimen for the last year or so.  She previously saw a psychiatrist, but has been stable on these medications.  Patient is very happy with how they are working for her.  Primarily, patient deals with depression.  She feels that her mood is under control at this point.  She does have generalized anxiety about \"everything\", but this is much better controlled with the BuSpar.  She is not currently seeing a counselor.  Patient denies any " suicidal ideations.    Fibromyalgia: Patient diagnosed with this at the [facility].  It sounds like she was having daily headaches, thought to be secondary to the fibromyalgia.  She was started on nortriptyline, 25 mg at night.  Patient denies frequent headaches.  She is tolerating the nortriptyline well.  She does continue to have pain, but symptoms are very manageable.  She is not currently seeing a rheumatologist.    History of mild, persistent asthma.  Patient currently using Singulair once daily and albuterol as needed.  Symptoms exacerbated by environmental allergens.  She feels her symptoms are well controlled at this time.  Denies any cough, shortness of breath, chest pain, or wheezing.    Esophagitis: History of acid reflux and esophagitis.  Patient takes Protonix once daily for this.  Symptoms are worse with anxiety.  She is not currently having any acid reflux pain or abdominal pain.  Denies any nausea or vomiting.      The following portions of the patient's history were reviewed and updated as appropriate: allergies, current medications, past family history, past medical history, past social history, past surgical history and problem list.    Review of Systems  A comprehensive review of systems was performed and was otherwise negative    Objective:     /84 (Patient Site: Right Arm, Patient Position: Sitting, Cuff Size: Adult Regular)  Pulse 96  Wt (!) 251 lb 4.8 oz (114 kg)  BMI 41.18 kg/m2    General Appearance: Alert, cooperative, no distress, appears stated age  Lungs: Clear to auscultation bilaterally, respirations unlabored  Heart: Regular rate and rhythm, S1 and S2 normal, no murmur, rub, or gallop  Psychologic: appropriate affective, answers all of my questions appropriately. No hallucinations, delusion, or suicidal ideations.    PHQ-9 Total Score: 10 (5/25/2018  2:00 PM)  CASA-7 Total Score: 12 (5/25/2018  2:00 PM)    KAYLA Mcgovern

## 2021-06-20 NOTE — LETTER
Letter by Gisele Mandel NP at      Author: Gisele Mandel NP Service: -- Author Type: --    Filed:  Encounter Date: 10/8/2020 Status: (Other)         October 9, 2020     Patient: Paige Baumann   YOB: 1990   Date of Visit: 10/8/2020       To Whom It May Concern:    It is my professional opinion that Paige be made accommodations to work remotely during the COVID-19 pandemic.    She has a few chronic medical conditions that increase her risk of serious complication.    She has a history of morbid obesity, as well as moderate asthma.    Her overall clinical picture is somewhat complicated by the fact that she is pregnant as well.    If you have any questions or concerns, please don't hesitate to call.    Sincerely,        Electronically signed by Ham Real CNP

## 2021-06-20 NOTE — LETTER
Letter by Ham Real CNP at      Author: Ham Real CNP Service: -- Author Type: --    Filed:  Encounter Date: 9/18/2020 Status: (Other)         September 18, 2020     Patient: Paige Baumann   YOB: 1990   Date of Visit: 9/18/2020       To Whom It May Concern:    It is my medical opinion that Paige Baumann be afforded the resources and ability to work remotely due to the COVID-19 pandemic.        If you have any questions or concerns, please don't hesitate to call.    Sincerely,        Electronically signed by Ham Real CNP

## 2021-06-20 NOTE — LETTER
Letter by Gisele Mandel NP at      Author: Gisele Mandel NP Service: -- Author Type: --    Filed:  Encounter Date: 9/21/2020 Status: (Other)         September 18, 2020      Patient: Paige Baumann   YOB: 1990   Date of Visit: 9/18/2020         To Whom It May Concern:     It is my medical opinion that Paige Baumann be afforded the resources and ability to work remotely due to the COVID-19 pandemic.           If you have any questions or concerns, please don't hesitate to call.     Sincerely,           Electronically signed by Ham Real CNP

## 2021-06-21 ENCOUNTER — RECORDS - HEALTHEAST (OUTPATIENT)
Dept: ADMINISTRATIVE | Facility: OTHER | Age: 31
End: 2021-06-21

## 2021-06-25 NOTE — PROGRESS NOTES
Paige Baumann is a 30 y.o. female who is being evaluated via a billable video visit.      How would you like to obtain your AVS? MyChart.  If dropped from the video visit, the video invitation should be resent by: Text to cell phone: [phone]  Will anyone else be joining your video visit? No      Video Start Time: 5:09 PM    Assessment & Plan     Postpartum anxiety  Increased anxiety postpartum.  Buspar is not recommended with breastfeeding.  Will increase Zoloft to 150 mg once daily.  Follow up in 3-4 weeks.  I have recommended stabilizing her mood first prior to starting the mini-pill.  I did encourage condoms for contraception in the meantime.   - sertraline (ZOLOFT) 100 MG tablet  Dispense: 135 tablet; Refill: 3    Mild episode of recurrent major depressive disorder (H)  Well controlled.   - sertraline (ZOLOFT) 100 MG tablet  Dispense: 135 tablet; Refill: 3       Return in about 4 weeks (around 2021).    Gisele Mandel NP  [facility]   Paige Baumann is 30 y.o. and presents today for medication refills.  Patient is ~2 months postpartum with her first child, a daughter named Meaghan.  Patient is breastfeeding.  History of anxiety and depression.  She was on Zoloft 100 mg daily throughout her pregnancy.  She did have a lot of anxiety towards the end of her pregnancy due to gestational hypertension.  Her anxiety has continued postpartum and is overriding her depression.  She has a lot of worry about her .  She is sleeping well when she can.  Not having panic attacks like she was in her 3rd trimester.  Patient was previously on Buspar, but according to Up To Date, this is not recommended with breastfeeding.  Patient's OBGYN suggested increasing her Zoloft.  Patient is seeing a therapist weekly.    She was also prescribed a progesterone-only OCP.  She has not yet started this, as she has some concerns that this will affect her mood.  OCPs  have done so in the past.        Review of Systems  Pertinent items in HPI      Objective       Vitals:  No vitals were obtained today due to virtual visit.    Physical Exam  General Appearance: Alert, cooperative, no distress, appears stated age  Psychologic: appropriate affective, answers all of my questions appropriately. No hallucinations, delusion, or suicidal ideations.            Video-Visit Details    Type of service:  Video Visit    Video End Time (time video stopped): 5:26 PM  Originating Location (pt. Location): Home    Distant Location (provider location):  [facility]     Platform used for Video Visit: [employer]

## 2021-06-26 NOTE — PROGRESS NOTES
Paige Baumann is a 30 y.o. female who is being evaluated via a billable telephone visit.      What phone number would you like to be contacted at? [phone]  How would you like to obtain your AVS? AVS Preference: [employer].    Assessment & Plan     Postpartum anxiety  Anxiety improved with increased Zoloft. GAD7 score still quite high, but patient seems to be doing very well.  Continue at 150 mg dose.  Follow up in 1 year or sooner as needed with any new or worsening symptoms.        Return in about 1 year (around 6/15/2022).    Gisele Mandel NP  [facility]   Paige Baumann is 30 y.o. and presents today for follow up.  Her Zoloft was increased to 150 mg about 1 month ago for postpartum anxiety.  She is feeling much better.  Still not sleeping the best due to her infant, but anxiety is better.  She will also be relocating to [place] where her family is living.  Patient is tolerating the increased dose well.  No concerns.     Review of Systems  Pertinent items in HPI      Objective       Vitals:  No vitals were obtained today due to virtual visit.    Physical Exam  No physical exam completed    PHQ-9 Total Score: 8 (6/15/2021  4:00 PM)    CASA 7 Total Score: 19 (6/15/2021  4:00 PM)            Phone call duration: 5 minutes    Gisele Mandel NP

## 2021-07-03 ENCOUNTER — HEALTH MAINTENANCE LETTER (OUTPATIENT)
Age: 31
End: 2021-07-03

## 2021-07-06 ASSESSMENT — PATIENT HEALTH QUESTIONNAIRE - PHQ9
SUM OF ALL RESPONSES TO PHQ QUESTIONS 1-9: 8
SUM OF ALL RESPONSES TO PHQ QUESTIONS 1-9: 8

## 2021-07-08 ASSESSMENT — ANXIETY QUESTIONNAIRES
GAD7 TOTAL SCORE: 19
GAD7 TOTAL SCORE: 19

## 2021-10-23 ENCOUNTER — HEALTH MAINTENANCE LETTER (OUTPATIENT)
Age: 31
End: 2021-10-23

## 2022-07-30 ENCOUNTER — HEALTH MAINTENANCE LETTER (OUTPATIENT)
Age: 32
End: 2022-07-30

## 2022-10-09 ENCOUNTER — HEALTH MAINTENANCE LETTER (OUTPATIENT)
Age: 32
End: 2022-10-09

## 2023-08-19 ENCOUNTER — HEALTH MAINTENANCE LETTER (OUTPATIENT)
Age: 33
End: 2023-08-19